# Patient Record
(demographics unavailable — no encounter records)

---

## 2025-06-04 NOTE — ASSESSMENT
[Weight Loss] : weight loss [FreeTextEntry1] : 28yo F presenting for initial visit for hypothyroidism & obesity.   Hx of Hypothyroidism/Subclinical hypothyroidism  Had been monitored off Levothyroxine with TFTs in range in .  TPO ab negative on several occasions in the past  - repeat TFTs today   Obesity - BMI 42  Weight fluctuates up/down she says depending on how consistent she is with adhering to her diet & exercise. BMI today 42.43;  mother is on Zepbound. Patient says she is currently not interested in initiating weight loss medications/weight loss interventions.  States she finds it hard to keep weight off but has lost weight before when she been consistent with diet/exercise. Wants to be more consistent with this.  - Amenable to nutrition/weight loss support at this time although she is not interested in other medica/surgical interventions for weight loss  - encouraged to reimplement dietary & lifestyle changes including healthy diet, calorie reduction (aim for 500 calories less per day), increase regular physical activity - aim for 150mins of cardio + incorporate strength training  - referred to medical weight management office for nutrition support  - check A1c & CMP today   hx of Irregular cycles, mild Hirsutism c/f PCOS - Patient reports she has recently also been concerned about PCOS as she has always struggled with her weight, has a few dark hairs on her face and has been hearing that this may be due to PCOS. Says she is also [place] so not sure if due to her ethnicity as similar to other family members. Has hx of irregular periods but has been on OCPs for many years, currently on Junel Fe, managed by her GYN.  PCP did hormonal evaluation in 2024. States she was on OCPs at that time as well. Prolactin wnl. Androgens low/low-normal range.  Normal pelvic sonogram in 2024.  - discussed with patient that hormonal evaluation for PCOS is not able to be reliably done while she is receiving exogenous hormones in the form of OCPs. Will need to be off OCPs for at least 3 months prior to testing her hormone level/HPG axis. 17OHP/Androstenedione likely low due to OCP effect in helping with hyperandrogenism.  - she is currently not interested in pregnancy and plans to continue on OCPs at this time. Discussed with patient that this would a treatment option for PCOS regardless, if that is the underlying cause of her symptoms.  -  Discussed that PCOS is a complex genetic disorder with a number of candidate genes and we do not know the exact cause so we treat the symptoms such as irregular periods, acne and unwanted hair growth. Women with PCOS are at increased risk of metabolic syndrome, hypertension, and diabetes independent of age or weight. Given irregular menstrual cycles, it can be difficult to conceive but no increased risk of infertility. Medications such as clomiphene citrate and letrozole can be used for fertility. Women with PCOS have a higher risk of gestational diabetes, pre-eclampsia,  delivery and C-sections in pregnancy. - Treatment of PCOS is aimed at sustained lifestyle modifications to lose weight, improve BMI, glycemic control and hypertriglyceridemia - Medications for PCOS include:  Metformin- for the treatment of weight, hormonal and metabolic outcomes.  Combined OCPs (loestrin-- norethindrone acetate/ethinyl estradiol 1mg/20mcg)- inhibits FSH/LH and increases SHBG; Should be recommended in adult women with PCOS for management of hyperandrogenism and irregular menstrual cycles >> She is on this currently.  Spironolactone works for acne management; harder for treatment of hirsutism Electrolysis/laser hair removal for the thick hair Must be on some form of birth control while on Spironolactone as can cause congenital defects in a male fetus - check A1c & CMP today

## 2025-06-04 NOTE — HISTORY OF PRESENT ILLNESS
[FreeTextEntry1] : 28yo F presenting for initial visit for hypothyroidism & obesity.  Previously followed with Dr. Nemesio Ward - last visit in 2022.   Had been monitored off Levothyroxine with TFTs in range in 2022.  Reports she has recently also been concerned about PCOS as she has always struggle with her weight. Weight fluctuates up/down she says depending on how consistent she is with adhering to her diet & exercise. Also not sure if related to her thyroid disease she says.  Also  has a few dark hairs on her face and has been hearing that this may be due to PCOS. Says she is also [place] so not sure if due to her ethnicity as similar to other family members. Has hx of irregular periods but has been on OCPs for many years, currently on Junel Fe, managed by her GYN.  PCP did hormonal evaluation in June 2024. States she was on OCPs at that time as well.  Normal pelvic sonogram in July 2024.  BMI today 42.43; mother is on Zepbound.  Patient says she is currently not interested in initiating weight loss medications/weight loss interventions.  States she has no energy, trouble losing weight but has lost weight before when she been consistent with diet/exercise  shortness of breath with exertion at times  had tingling in her hands last year - now resolved - was concerned because she was told that carpal tunnel could be associated with hypothyroidism... also with obesity.   Other meds - Junel Fe  ALL - penicillin  Social - lives alone, she is an  for [employer]; no tobacco, no drugs, rare etoh use  Gives permission to leave  if needed. Can also discuss results with mother, Grace Jamel - [phone] or [phone]

## 2025-06-04 NOTE — HISTORY OF PRESENT ILLNESS
[FreeTextEntry1] : 28yo F presenting for initial visit for hypothyroidism & obesity.  Previously followed with Dr. Nemesio Ward - last visit in 2022.   Had been monitored off Levothyroxine with TFTs in range in 2022.  Reports she has recently also been concerned about PCOS as she has always struggle with her weight. Weight fluctuates up/down she says depending on how consistent she is with adhering to her diet & exercise. Also not sure if related to her thyroid disease she says.  Also  has a few dark hairs on her face and has been hearing that this may be due to PCOS. Says she is also [place] so not sure if due to her ethnicity as similar to other family members. Has hx of irregular periods but has been on OCPs for many years, currently on Junel Fe, managed by her GYN.  PCP did hormonal evaluation in June 2024. States she was on OCPs at that time as well.  Normal pelvic sonogram in July 2024.  BMI today 42.43; mother is on Zepbound.  Patient says she is currently not interested in initiating weight loss medications/weight loss interventions.  States she has no energy, trouble losing weight but has lost weight before when she been consistent with diet/exercise  shortness of breath with exertion at times  had tingling in her hands last year - now resolved - was concerned because she was told that carpal tunnel could be associated with hypothyroidism... also with obesity.   Other meds - Junel Fe  ALL - penicillin  Social - lives alone, she is an  for [employer]; no tobacco, no drugs, rare etoh use  Gives permission to leave  if needed. Can also discuss results with mother, Grace Jamel - [phone] or [phone]

## 2025-06-04 NOTE — PHYSICAL EXAM
[Alert] : alert [Well Nourished] : well nourished [Obese] : obese [No Acute Distress] : no acute distress [Well Developed] : well developed [Normal Sclera/Conjunctiva] : normal sclera/conjunctiva [EOMI] : extra ocular movement intact [No Proptosis] : no proptosis [Thyroid Not Enlarged] : the thyroid was not enlarged [No Respiratory Distress] : no respiratory distress [No Accessory Muscle Use] : no accessory muscle use [Normal Rate] : heart rate was normal [Regular Rhythm] : with a regular rhythm [No Edema] : no peripheral edema [Not Tender] : non-tender [Not Distended] : not distended [Soft] : abdomen soft [No Spinal Tenderness] : no spinal tenderness [Spine Straight] : spine straight [No Stigmata of Cushings Syndrome] : no stigmata of Cushings Syndrome [Normal Gait] : normal gait [No Clubbing, Cyanosis] : no clubbing  or cyanosis of the fingernails [Normal Strength/Tone] : muscle strength and tone were normal [Hirsutism] : hirsutism present [No Motor Deficits] : the motor exam was normal [No Tremors] : no tremors [Oriented x3] : oriented to person, place, and time [Normal Affect] : the affect was normal [Normal Insight/Judgement] : insight and judgment were intact [Abdominal Striae] : no abdominal striae [Acanthosis Nigricans] : no acanthosis nigricans [Foot Ulcers] : no foot ulcers [Acne] : no acne

## 2025-07-08 NOTE — HISTORY OF PRESENT ILLNESS
[FreeTextEntry1] : This 27 year old P 0000  LMP 6/27/25 presents for annual.  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  [PapSmeardate] : Oct 2022 [TextBox_31] : neg pap

## 2025-07-08 NOTE — PHYSICAL EXAM
[MA] : MA [Appropriately responsive] : appropriately responsive [Alert] : alert [No Acute Distress] : no acute distress [Soft] : soft [Non-tender] : non-tender [No Lesions] : no lesions [Oriented x3] : oriented x3 [Examination Of The Breasts] : a normal appearance [No Discharge] : no discharge [No Masses] : no breast masses were palpable [Labia Majora] : normal [Labia Minora] : normal [No Bleeding] : There was no active vaginal bleeding [Normal] : normal [Uterine Adnexae] : non-palpable [FreeTextEntry2] : Karely [Tenderness] : nontender

## 2025-07-08 NOTE — PLAN
[FreeTextEntry1] : OCP renewed  Lifestyle Medicine handouts regarding whole food plant-based diet provided  Bone health and Osteoporosis information sheet in hand outlining osteoporosis and bone basics RTO prn or for annual